# Patient Record
Sex: MALE | Race: WHITE | Employment: UNEMPLOYED | URBAN - METROPOLITAN AREA
[De-identification: names, ages, dates, MRNs, and addresses within clinical notes are randomized per-mention and may not be internally consistent; named-entity substitution may affect disease eponyms.]

---

## 2021-07-28 ENCOUNTER — APPOINTMENT (OUTPATIENT)
Dept: CT IMAGING | Age: 46
End: 2021-07-28
Payer: COMMERCIAL

## 2021-07-28 ENCOUNTER — HOSPITAL ENCOUNTER (EMERGENCY)
Age: 46
Discharge: HOME | End: 2021-07-28
Attending: EMERGENCY MEDICINE
Payer: COMMERCIAL

## 2021-07-28 VITALS
WEIGHT: 195 LBS | DIASTOLIC BLOOD PRESSURE: 88 MMHG | OXYGEN SATURATION: 98 % | HEIGHT: 70 IN | RESPIRATION RATE: 17 BRPM | SYSTOLIC BLOOD PRESSURE: 132 MMHG | TEMPERATURE: 98.3 F | BODY MASS INDEX: 27.92 KG/M2 | HEART RATE: 72 BPM

## 2021-07-28 DIAGNOSIS — J01.00 ACUTE MAXILLARY SINUSITIS, RECURRENCE NOT SPECIFIED: ICD-10-CM

## 2021-07-28 DIAGNOSIS — R42 DIZZINESS: Primary | ICD-10-CM

## 2021-07-28 LAB
ALBUMIN SERPL-MCNC: 4.5 G/DL (ref 3.5–4.6)
ALP BLD-CCNC: 64 U/L (ref 35–104)
ALT SERPL-CCNC: 19 U/L (ref 0–41)
ANION GAP SERPL CALCULATED.3IONS-SCNC: 12 MEQ/L (ref 9–15)
AST SERPL-CCNC: 13 U/L (ref 0–40)
BASOPHILS ABSOLUTE: 0 K/UL (ref 0–0.1)
BASOPHILS RELATIVE PERCENT: 0.6 % (ref 0.2–1.2)
BILIRUB SERPL-MCNC: 0.7 MG/DL (ref 0.2–0.7)
BUN BLDV-MCNC: 12 MG/DL (ref 6–20)
CALCIUM SERPL-MCNC: 9.3 MG/DL (ref 8.5–9.9)
CHLORIDE BLD-SCNC: 103 MEQ/L (ref 95–107)
CO2: 23 MEQ/L (ref 20–31)
CREAT SERPL-MCNC: 0.76 MG/DL (ref 0.7–1.2)
EKG ATRIAL RATE: 75 BPM
EKG P AXIS: 3 DEGREES
EKG P-R INTERVAL: 140 MS
EKG Q-T INTERVAL: 388 MS
EKG QRS DURATION: 86 MS
EKG QTC CALCULATION (BAZETT): 433 MS
EKG R AXIS: -6 DEGREES
EKG T AXIS: -10 DEGREES
EKG VENTRICULAR RATE: 75 BPM
EOSINOPHILS ABSOLUTE: 0.2 K/UL (ref 0–0.5)
EOSINOPHILS RELATIVE PERCENT: 3.3 % (ref 0.8–7)
GFR AFRICAN AMERICAN: >60
GFR NON-AFRICAN AMERICAN: >60
GLOBULIN: 3.5 G/DL (ref 2.3–3.5)
GLUCOSE BLD-MCNC: 95 MG/DL (ref 70–99)
HCT VFR BLD CALC: 45.3 % (ref 42–52)
HEMOGLOBIN: 15.9 G/DL (ref 13.7–17.5)
IMMATURE GRANULOCYTES #: 0 K/UL
IMMATURE GRANULOCYTES %: 0.4 %
LYMPHOCYTES ABSOLUTE: 1.3 K/UL (ref 1.3–3.6)
LYMPHOCYTES RELATIVE PERCENT: 24.8 %
MAGNESIUM: 2 MG/DL (ref 1.7–2.4)
MCH RBC QN AUTO: 30.6 PG (ref 25.7–32.2)
MCHC RBC AUTO-ENTMCNC: 35.1 % (ref 32.3–36.5)
MCV RBC AUTO: 87.1 FL (ref 79–92.2)
MONOCYTES ABSOLUTE: 0.4 K/UL (ref 0.3–0.8)
MONOCYTES RELATIVE PERCENT: 7 % (ref 5.3–12.2)
NEUTROPHILS ABSOLUTE: 3.3 K/UL (ref 1.8–5.4)
NEUTROPHILS RELATIVE PERCENT: 63.9 % (ref 34–67.9)
PDW BLD-RTO: 12.6 % (ref 11.6–14.4)
PLATELET # BLD: 174 K/UL (ref 163–337)
POTASSIUM SERPL-SCNC: 4.3 MEQ/L (ref 3.4–4.9)
RBC # BLD: 5.2 M/UL (ref 4.63–6.08)
SODIUM BLD-SCNC: 138 MEQ/L (ref 135–144)
TOTAL PROTEIN: 8 G/DL (ref 6.3–8)
TROPONIN: <0.01 NG/ML (ref 0–0.01)
WBC # BLD: 5.1 K/UL (ref 4.2–9)

## 2021-07-28 PROCEDURE — 84484 ASSAY OF TROPONIN QUANT: CPT

## 2021-07-28 PROCEDURE — 6370000000 HC RX 637 (ALT 250 FOR IP): Performed by: EMERGENCY MEDICINE

## 2021-07-28 PROCEDURE — 99284 EMERGENCY DEPT VISIT MOD MDM: CPT

## 2021-07-28 PROCEDURE — 85025 COMPLETE CBC W/AUTO DIFF WBC: CPT

## 2021-07-28 PROCEDURE — 36415 COLL VENOUS BLD VENIPUNCTURE: CPT

## 2021-07-28 PROCEDURE — 70450 CT HEAD/BRAIN W/O DYE: CPT

## 2021-07-28 PROCEDURE — 80053 COMPREHEN METABOLIC PANEL: CPT

## 2021-07-28 PROCEDURE — 93005 ELECTROCARDIOGRAM TRACING: CPT

## 2021-07-28 PROCEDURE — 2580000003 HC RX 258: Performed by: EMERGENCY MEDICINE

## 2021-07-28 PROCEDURE — 83735 ASSAY OF MAGNESIUM: CPT

## 2021-07-28 PROCEDURE — 93010 ELECTROCARDIOGRAM REPORT: CPT | Performed by: INTERNAL MEDICINE

## 2021-07-28 RX ORDER — MECLIZINE HYDROCHLORIDE 25 MG/1
25 TABLET ORAL 3 TIMES DAILY PRN
Qty: 30 TABLET | Refills: 0 | Status: SHIPPED | OUTPATIENT
Start: 2021-07-28 | End: 2021-08-07

## 2021-07-28 RX ORDER — 0.9 % SODIUM CHLORIDE 0.9 %
1000 INTRAVENOUS SOLUTION INTRAVENOUS ONCE
Status: COMPLETED | OUTPATIENT
Start: 2021-07-28 | End: 2021-07-28

## 2021-07-28 RX ORDER — MECLIZINE HCL 12.5 MG/1
25 TABLET ORAL ONCE
Status: COMPLETED | OUTPATIENT
Start: 2021-07-28 | End: 2021-07-28

## 2021-07-28 RX ORDER — CEFDINIR 300 MG/1
300 CAPSULE ORAL 2 TIMES DAILY
Qty: 20 CAPSULE | Refills: 0 | Status: SHIPPED | OUTPATIENT
Start: 2021-07-28 | End: 2021-08-07

## 2021-07-28 RX ADMIN — SODIUM CHLORIDE 1000 ML: 9 INJECTION, SOLUTION INTRAVENOUS at 14:12

## 2021-07-28 RX ADMIN — MECLIZINE 25 MG: 12.5 TABLET ORAL at 14:13

## 2021-07-28 ASSESSMENT — ENCOUNTER SYMPTOMS
CONSTIPATION: 0
CHEST TIGHTNESS: 0
BACK PAIN: 0
COUGH: 0
EYE REDNESS: 0
SINUS PRESSURE: 0
FACIAL SWELLING: 0
SORE THROAT: 0
TROUBLE SWALLOWING: 0
CHOKING: 0
WHEEZING: 0
VOICE CHANGE: 0
VOMITING: 0
BLOOD IN STOOL: 0
EYE DISCHARGE: 0
EYE PAIN: 0
SHORTNESS OF BREATH: 0
STRIDOR: 0
ABDOMINAL PAIN: 0
DIARRHEA: 0

## 2021-07-28 ASSESSMENT — PAIN SCALES - GENERAL: PAINLEVEL_OUTOF10: 4

## 2021-07-28 ASSESSMENT — PAIN DESCRIPTION - FREQUENCY: FREQUENCY: CONTINUOUS

## 2021-07-28 ASSESSMENT — PAIN DESCRIPTION - PAIN TYPE: TYPE: ACUTE PAIN

## 2021-07-28 ASSESSMENT — PAIN DESCRIPTION - ORIENTATION: ORIENTATION: LEFT

## 2021-07-28 ASSESSMENT — PAIN DESCRIPTION - PROGRESSION: CLINICAL_PROGRESSION: GRADUALLY WORSENING

## 2021-07-28 ASSESSMENT — PAIN DESCRIPTION - LOCATION: LOCATION: SHOULDER;BACK

## 2021-07-28 ASSESSMENT — PAIN DESCRIPTION - ONSET: ONSET: PROGRESSIVE

## 2021-07-28 NOTE — ED PROVIDER NOTES
[address] ED  eMERGENCY dEPARTMENT eNCOUnter      Pt Name: Dmitry Jaime  MRN: [id number]  [place] 1975  Date of evaluation: 7/28/2021  Provider: Jerad Oshea MD    [address]       Chief Complaint   Patient presents with    Dizziness     Started at 10am, no history of vertigo, sinus infection last week       HISTORY OF PRESENT ILLNESS   (Location/Symptom, Timing/Onset,Context/Setting, Quality, Duration, Modifying Factors, Severity)  Note limiting factors. Dmitry Jaime is a 39 y.o. male who presents to the emergency department patient come to the emergency with sudden onset of dizziness unable to hold the walls when he went to the bathroom was fine last night denies any headache but has sinus infection sinus congestion did 3 days of decongestant last week no fever no neck pain no injury no fall no prior history of vertigo denies any nausea vomiting eating drinking normally denies any alcohol last night has no chest tightness no chest pain no palpitation as per patient he has a lump in the testicle for long time and has appointment coming up in the month of August no UTI symptoms no fever no chills no drainage  HPI    NursingNotes were reviewed. REVIEW OF SYSTEMS    (2-9 systems for level 4, 10 or more for level 5)     Review of Systems   Constitutional: Negative. Negative for activity change and fever. HENT: Negative for congestion, drooling, facial swelling, mouth sores, nosebleeds, sinus pressure, sore throat, trouble swallowing and voice change. Eyes: Negative for pain, discharge, redness and visual disturbance. Respiratory: Negative for cough, choking, chest tightness, shortness of breath, wheezing and stridor. Cardiovascular: Negative for chest pain, palpitations and leg swelling. Gastrointestinal: Negative for abdominal pain, blood in stool, constipation, diarrhea and vomiting. Endocrine: Negative for cold intolerance, polyphagia and polyuria.    Genitourinary: Negative for dysuria, flank pain, frequency, genital sores and urgency. Musculoskeletal: Negative for back pain, joint swelling, neck pain and neck stiffness. Skin: Negative for pallor and rash. Neurological: Positive for dizziness, weakness and light-headedness. Negative for tremors, seizures, syncope, numbness and headaches. Hematological: Negative for adenopathy. Does not bruise/bleed easily. Psychiatric/Behavioral: Negative for agitation, behavioral problems, hallucinations and sleep disturbance. The patient is not hyperactive. All other systems reviewed and are negative. Except as noted above the remainder of the review of systems was reviewed and negative. PAST MEDICAL HISTORY     Past Medical History:   Diagnosis Date    Kidney stone          SURGICALHISTORY       Past Surgical History:   Procedure Laterality Date    FRACTURE SURGERY      VASECTOMY           CURRENT MEDICATIONS       Previous Medications    No medications on file       ALLERGIES     Patient has no known allergies. FAMILY HISTORY     History reviewed. No pertinent family history. SOCIAL HISTORY       Social History     Socioeconomic History    Marital status:      Spouse name: None    Number of children: None    Years of education: None    Highest education level: None   Occupational History    None   Tobacco Use    Smoking status: Never Smoker    Smokeless tobacco: Never Used   Vaping Use    Vaping Use: Never used   Substance and Sexual Activity    Alcohol use: Yes     Comment: social    Drug use: Never    Sexual activity: Never   Other Topics Concern    None   Social History Narrative    None     Social Determinants of Health     Financial Resource Strain:     Difficulty of Paying Living Expenses:    Food Insecurity:     Worried About Running Out of Food in the Last Year:     [address] in the Last Year:    Transportation Needs:     Lack of Transportation (Medical):      Lack of Transportation (Non-Medical):    Physical Activity:     Days of Exercise per Week:     Minutes of Exercise per Session:    Stress:     Feeling of Stress :    Social Connections:     Frequency of Communication with Friends and Family:     Frequency of Social Gatherings with Friends and Family:     Attends [employer] Services:     Active Member of Clubs or Organizations:     Attends Club or Organization Meetings:     Marital Status:    Intimate Partner Violence:     Fear of Current or Ex-Partner:     Emotionally Abused:     Physically Abused:     Sexually Abused:        SCREENINGS   NIH Stroke Scale  NIH Stroke Scale Assessed: Yes  Interval: Baseline  Level of Consciousness (1a. ): Alert  LOC Questions (1b. ): Answers both correctly  LOC Commands (1c. ): Performs both tasks correctly  Best Gaze (2. ): Normal  Visual (3. ): No visual loss  Facial Palsy (4. ): Normal symmetrical movement  Motor Arm, Left (5a. ): No drift  Motor Arm, Right (5b. ): No drift  Motor Leg, Left (6a. ): No drift  Motor Leg, Right (6b. ): No drift  Limb Ataxia (7. ): Absent  Sensory (8. ): Normal  Best Language (9. ): No aphasia  Dysarthria (10. ): Normal  Extinction and Inattention (11): No abnormality  Total: 0  @FLOW([id number])@      PHYSICAL EXAM    (up to 7 for level 4, 8 or more for level 5)     ED Triage Vitals [07/28/21 1323]   BP Temp Temp Source Pulse Resp SpO2 Height Weight   (!) 150/92 98.3 °F (36.8 °C) Oral 80 18 96 % 5' 10\" (1.778 m) 195 lb (88.5 kg)       Physical Exam  Vitals and nursing note reviewed. Constitutional:       General: He is not in acute distress. Appearance: Normal appearance. He is well-developed. He is not ill-appearing, toxic-appearing or diaphoretic. Comments: Alert cooperative patient talks in full sentences no slurred speech noted moving all the extremities very well looks in sound congested nasally when he talk   HENT:      Head: Normocephalic and atraumatic.       Right Ear: Tympanic membrane, ear canal and external ear normal.      Left Ear: Tympanic membrane, ear canal and external ear normal.      Nose: Nose normal.      Comments: Attention go to ear nose throat patient nasally congested there is no exudate no blisters in the mouth     Mouth/Throat:      Mouth: Mucous membranes are moist.      Pharynx: No posterior oropharyngeal erythema. Eyes:      General:         Right eye: No discharge. Left eye: No discharge. Extraocular Movements: Extraocular movements intact. Comments: Attention to the eyes pupil 5 to 6 mm equal reactive light no nystagmus noted fundi are sharp   Cardiovascular:      Rate and Rhythm: Normal rate and regular rhythm. Heart sounds: Normal heart sounds. No murmur heard. No gallop. Pulmonary:      Effort: No respiratory distress. Breath sounds: Normal breath sounds. No wheezing. Abdominal:      General: Bowel sounds are normal.      Palpations: Abdomen is soft. There is no mass. Tenderness: There is no rebound. Genitourinary:     Penis: Normal.       Testes: Normal.      Comments: Patient genitalia exam is normal circumcised male patient both testicles are down no cellulitis no hydrocele noted patient has a minimal scar tissue or thickening of the skin to the right scrotal area noted no sebaceous cyst noted no cellulitis noted  Musculoskeletal:         General: No swelling, tenderness or deformity. Normal range of motion. Cervical back: Neck supple. No rigidity or tenderness. Right lower leg: No edema. Lymphadenopathy:      Cervical: No cervical adenopathy. Skin:     General: Skin is warm. Capillary Refill: Capillary refill takes less than 2 seconds. Coloration: Skin is not jaundiced. Findings: No bruising, erythema, lesion or rash. Neurological:      General: No focal deficit present. Mental Status: He is alert and oriented to person, place, and time.       Cranial Nerves: No cranial nerve deficit. Sensory: No sensory deficit. Motor: No weakness or abnormal muscle tone. Coordination: Coordination normal.      Gait: Gait normal.      Deep Tendon Reflexes: Reflexes normal.   Psychiatric:         Behavior: Behavior normal.         Thought Content: Thought content normal.         DIAGNOSTIC RESULTS     EKG: All EKG's are interpreted by the Emergency Department Physician who either signs or Co-signsthis chart in the absence of a cardiologist.        RADIOLOGY:   Jose Auberry such as CT, Ultrasound and MRI are read by the radiologist. Bryce Cleverly radiographic images are visualized and preliminarily interpreted by the emergency physician with the below findings:        Interpretation per the Radiologist below, if available at the time ofthis note:    CT Head WO Contrast   Final Result   No acute hemorrhage or extra-axial fluid collection is seen. Chronic sinus disease is seen. .      All CT scans at this facility use dose modulation, iterative reconstruction, and/or weight based dosing when appropriate to reduce radiation dose to as low as reasonably achievable. ED BEDSIDE ULTRASOUND:   Performed by ED Physician - none    LABS:  Labs Reviewed   COMPREHENSIVE METABOLIC PANEL   CBC WITH AUTO DIFFERENTIAL   MAGNESIUM   TROPONIN       All other labs were within normal range or not returned as of this dictation.     EMERGENCY DEPARTMENT COURSE and DIFFERENTIAL DIAGNOSIS/MDM:   Vitals:    Vitals:    07/28/21 1323 07/28/21 1441   BP: (!) 150/92 132/88   Pulse: 80 72   Resp: 18 17   Temp: 98.3 °F (36.8 °C)    TempSrc: Oral    SpO2: 96% 98%   Weight: 195 lb (88.5 kg)    Height: 5' 10\" (1.778 m)            MDM  Number of Diagnoses or Management Options  Acute maxillary sinusitis, recurrence not specified  Dizziness  Diagnosis management comments: Patient started on for dizziness this morning when he woke up has to hold the wall become lightheaded and change position make it worse history of chronic sinus infection nasal congestion did take decongestant for 3 days last week patient initial blood pressure was slightly on the higher side but came back nicely getting hydration feeling better but not completely CAT scan performed has a chronic maxillary and ethmoid sinus infection otherwise essentially normal EKG performed normal sinus sinus rhythm with minimal criteria for LVH rate of 75/min FL interval 140 ms QT interval 388 ms and QRS [id number] ms patient had no neuro deficit patient scrotal examination performed has no cyst both testicles are down no cellulitis seems more likely thickening of the skin with a scar tissue formation in the right testicle       Amount and/or Complexity of Data Reviewed  Clinical lab tests: ordered and reviewed  Tests in the radiology section of CPT®: ordered and reviewed        CRITICAL CARE TIME   Total Critical Care time was  minutes, excluding separately reportableprocedures. There was a high probability of clinicallysignificant/life threatening deterioration in the patient's condition which required my urgent intervention. ONSULTS:  None    PROCEDURES:  Unless otherwise noted below, none     Procedures    FINAL IMPRESSION      1. Dizziness    2.  Acute maxillary sinusitis, recurrence not specified          DISPOSITION/PLAN   DISPOSITION        PATIENT REFERRED TO:  Beto Wilkerson DO  [address] [facility] [address]  [phone]    In 2 days        DISCHARGE MEDICATIONS:  New Prescriptions    CEFDINIR (OMNICEF) 300 MG CAPSULE    Take 1 capsule by mouth 2 times daily for 10 days    MECLIZINE (ANTIVERT) 25 MG TABLET    Take 1 tablet by mouth 3 times daily as needed for Dizziness          (Please note that portions of this note were completed with a voice recognition program.  Efforts were made to edit the dictations but occasionally words are mis-transcribed.)    Armond Martin MD (electronically signed)  Attending Emergency Physician       Armond Martin MD  07/28/21 Radha Ivy MD  07/28/21 Radha Ivy MD  07/28/21 6215

## 2021-07-28 NOTE — ED TRIAGE NOTES
Patient in with dizziness that he states he woke up with, denies chest pain or SOB. Denies head pain or head injury.

## 2024-12-03 ENCOUNTER — OFFICE VISIT (OUTPATIENT)
Dept: PRIMARY CARE CLINIC | Age: 49
End: 2024-12-03
Payer: COMMERCIAL

## 2024-12-03 VITALS
SYSTOLIC BLOOD PRESSURE: 132 MMHG | TEMPERATURE: 79 F | OXYGEN SATURATION: 98 % | DIASTOLIC BLOOD PRESSURE: 84 MMHG | BODY MASS INDEX: 29.78 KG/M2 | HEIGHT: 70 IN | WEIGHT: 208 LBS

## 2024-12-03 DIAGNOSIS — E78.2 MIXED HYPERLIPIDEMIA: ICD-10-CM

## 2024-12-03 DIAGNOSIS — Z13.1 SCREENING FOR DIABETES MELLITUS: ICD-10-CM

## 2024-12-03 DIAGNOSIS — J32.9 CHRONIC SINUSITIS, UNSPECIFIED LOCATION: Primary | ICD-10-CM

## 2024-12-03 DIAGNOSIS — E06.3 HASHIMOTO'S THYROIDITIS: ICD-10-CM

## 2024-12-03 DIAGNOSIS — E53.8 B12 DEFICIENCY: ICD-10-CM

## 2024-12-03 DIAGNOSIS — N20.1 URETERAL CALCULI: ICD-10-CM

## 2024-12-03 DIAGNOSIS — E55.9 VITAMIN D DEFICIENCY: ICD-10-CM

## 2024-12-03 DIAGNOSIS — M48.02 SPINAL STENOSIS OF CERVICAL REGION: ICD-10-CM

## 2024-12-03 DIAGNOSIS — G43.119 INTRACTABLE MIGRAINE WITH AURA WITHOUT STATUS MIGRAINOSUS: ICD-10-CM

## 2024-12-03 DIAGNOSIS — K76.0 HEPATIC STEATOSIS: ICD-10-CM

## 2024-12-03 PROBLEM — K76.89 LIVER CYST: Status: ACTIVE | Noted: 2023-02-08

## 2024-12-03 PROBLEM — N20.0 NEPHROLITHIASIS: Status: ACTIVE | Noted: 2023-02-08

## 2024-12-03 LAB
ALBUMIN SERPL-MCNC: 4.6 G/DL (ref 3.5–4.6)
ALP SERPL-CCNC: 60 U/L (ref 35–104)
ALT SERPL-CCNC: 29 U/L (ref 0–41)
ANION GAP SERPL CALCULATED.3IONS-SCNC: 11 MEQ/L (ref 9–15)
AST SERPL-CCNC: 25 U/L (ref 0–40)
BILIRUB SERPL-MCNC: 0.6 MG/DL (ref 0.2–0.7)
BUN SERPL-MCNC: 11 MG/DL (ref 6–20)
CALCIUM SERPL-MCNC: 9.4 MG/DL (ref 8.5–9.9)
CHLORIDE SERPL-SCNC: 103 MEQ/L (ref 95–107)
CHOLEST SERPL-MCNC: 212 MG/DL (ref 0–199)
CO2 SERPL-SCNC: 25 MEQ/L (ref 20–31)
CREAT SERPL-MCNC: 0.78 MG/DL (ref 0.7–1.2)
GLOBULIN SER CALC-MCNC: 2.9 G/DL (ref 2.3–3.5)
GLUCOSE SERPL-MCNC: 103 MG/DL (ref 70–99)
HDLC SERPL-MCNC: 31 MG/DL (ref 40–59)
LDL CHOLESTEROL: 133 MG/DL (ref 0–129)
POTASSIUM SERPL-SCNC: 4.4 MEQ/L (ref 3.4–4.9)
PROT SERPL-MCNC: 7.5 G/DL (ref 6.3–8)
SODIUM SERPL-SCNC: 139 MEQ/L (ref 135–144)
T4 FREE SERPL-MCNC: 1.05 NG/DL (ref 0.84–1.68)
TRIGLYCERIDE, FASTING: 238 MG/DL (ref 0–150)
TSH SERPL-MCNC: 5.18 UIU/ML (ref 0.44–3.86)

## 2024-12-03 PROCEDURE — 99204 OFFICE O/P NEW MOD 45 MIN: CPT | Performed by: STUDENT IN AN ORGANIZED HEALTH CARE EDUCATION/TRAINING PROGRAM

## 2024-12-03 RX ORDER — FLUTICASONE PROPIONATE 50 MCG
1 SPRAY, SUSPENSION (ML) NASAL DAILY PRN
COMMUNITY

## 2024-12-03 SDOH — ECONOMIC STABILITY: FOOD INSECURITY: WITHIN THE PAST 12 MONTHS, THE FOOD YOU BOUGHT JUST DIDN'T LAST AND YOU DIDN'T HAVE MONEY TO GET MORE.: NEVER TRUE

## 2024-12-03 SDOH — ECONOMIC STABILITY: FOOD INSECURITY: WITHIN THE PAST 12 MONTHS, YOU WORRIED THAT YOUR FOOD WOULD RUN OUT BEFORE YOU GOT MONEY TO BUY MORE.: NEVER TRUE

## 2024-12-03 SDOH — ECONOMIC STABILITY: INCOME INSECURITY: HOW HARD IS IT FOR YOU TO PAY FOR THE VERY BASICS LIKE FOOD, HOUSING, MEDICAL CARE, AND HEATING?: NOT HARD AT ALL

## 2024-12-03 ASSESSMENT — PATIENT HEALTH QUESTIONNAIRE - PHQ9
SUM OF ALL RESPONSES TO PHQ QUESTIONS 1-9: 0
SUM OF ALL RESPONSES TO PHQ QUESTIONS 1-9: 0
SUM OF ALL RESPONSES TO PHQ9 QUESTIONS 1 & 2: 0
SUM OF ALL RESPONSES TO PHQ QUESTIONS 1-9: 0
SUM OF ALL RESPONSES TO PHQ QUESTIONS 1-9: 0
2. FEELING DOWN, DEPRESSED OR HOPELESS: NOT AT ALL
1. LITTLE INTEREST OR PLEASURE IN DOING THINGS: NOT AT ALL

## 2024-12-03 NOTE — PROGRESS NOTES
Pupils: Pupils are equal, round, and reactive to light.   Cardiovascular:      Rate and Rhythm: Normal rate and regular rhythm.      Pulses: Normal pulses.      Heart sounds: Normal heart sounds.   Pulmonary:      Effort: No respiratory distress.      Breath sounds: Normal breath sounds.   Musculoskeletal:      Cervical back: Normal range of motion.   Skin:     Findings: No rash.   Neurological:      General: No focal deficit present.      Mental Status: He is alert and oriented to person, place, and time.   Psychiatric:         Mood and Affect: Mood normal.               Reviewed with the patient: current clinical status, medications, activities and diet.     Side effects, adverse effects of the medication prescribed today, as well as treatment plan/ rationale and result expectations have been discussed with the patient who expresses understanding and desires to proceed.    Close follow up to evaluate treatment results and for coordination of care.  I have reviewed the patient's medical history in detail and updated the computerized patient record.    Kirby Rivero MD

## 2024-12-03 NOTE — PATIENT INSTRUCTIONS
Please have your labs completed and we will follow-up with you about the results  Take Augmentin twice daily for 5 days and if no improvement we can try a steroid.  If you receive an email to fill out a survey about our care here today, I would greatly appreciate it if you could fill it out for me, thank you!

## 2024-12-04 LAB
ESTIMATED AVERAGE GLUCOSE: 117 MG/DL
FOLATE: 10.5 NG/ML (ref 4.8–24.2)
HBA1C MFR BLD: 5.7 % (ref 4–6)
VITAMIN B-12: 342 PG/ML (ref 232–1245)
VITAMIN D 25-HYDROXY: 29.1 NG/ML (ref 30–100)

## 2024-12-04 RX ORDER — ERGOCALCIFEROL 1.25 MG/1
50000 CAPSULE, LIQUID FILLED ORAL WEEKLY
Qty: 12 CAPSULE | Refills: 1 | Status: SHIPPED | OUTPATIENT
Start: 2024-12-04

## 2024-12-23 ENCOUNTER — TELEPHONE (OUTPATIENT)
Dept: PRIMARY CARE CLINIC | Age: 49
End: 2024-12-23

## 2024-12-23 DIAGNOSIS — J32.9 CHRONIC SINUSITIS, UNSPECIFIED LOCATION: Primary | ICD-10-CM

## 2024-12-23 NOTE — TELEPHONE ENCOUNTER
Pt was just here to see Dr. SCHULER on 12/3.  He states that he was to call back if the antibiotic did not work.  Pt is requesting a Z-Nii since he had minor relief and had to call off work yesterday and the day before.  Pt would like this to be sent to [facility] in [place] on [address].

## 2024-12-24 RX ORDER — AZITHROMYCIN 250 MG/1
TABLET, FILM COATED ORAL
Qty: 6 TABLET | Refills: 0 | Status: SHIPPED | OUTPATIENT
Start: 2024-12-24 | End: 2025-01-03

## 2025-07-02 ENCOUNTER — OFFICE VISIT (OUTPATIENT)
Dept: PRIMARY CARE CLINIC | Age: 50
End: 2025-07-02
Payer: COMMERCIAL

## 2025-07-02 VITALS
HEIGHT: 70 IN | BODY MASS INDEX: 29.35 KG/M2 | SYSTOLIC BLOOD PRESSURE: 134 MMHG | HEART RATE: 82 BPM | DIASTOLIC BLOOD PRESSURE: 88 MMHG | WEIGHT: 205 LBS | OXYGEN SATURATION: 98 %

## 2025-07-02 DIAGNOSIS — S39.012A BACK STRAIN, INITIAL ENCOUNTER: ICD-10-CM

## 2025-07-02 DIAGNOSIS — E06.3 HASHIMOTO'S THYROIDITIS: Primary | ICD-10-CM

## 2025-07-02 DIAGNOSIS — N20.0 NEPHROLITHIASIS: ICD-10-CM

## 2025-07-02 DIAGNOSIS — K76.0 HEPATIC STEATOSIS: ICD-10-CM

## 2025-07-02 DIAGNOSIS — E78.2 MIXED HYPERLIPIDEMIA: ICD-10-CM

## 2025-07-02 DIAGNOSIS — E55.9 VITAMIN D DEFICIENCY: ICD-10-CM

## 2025-07-02 PROCEDURE — 99214 OFFICE O/P EST MOD 30 MIN: CPT | Performed by: STUDENT IN AN ORGANIZED HEALTH CARE EDUCATION/TRAINING PROGRAM

## 2025-07-02 PROCEDURE — 96372 THER/PROPH/DIAG INJ SC/IM: CPT | Performed by: STUDENT IN AN ORGANIZED HEALTH CARE EDUCATION/TRAINING PROGRAM

## 2025-07-02 RX ORDER — KETOROLAC TROMETHAMINE 10 MG/1
10 TABLET, FILM COATED ORAL EVERY 6 HOURS PRN
Qty: 120 TABLET | Refills: 5 | Status: SHIPPED | OUTPATIENT
Start: 2025-07-02 | End: 2025-12-29

## 2025-07-02 RX ORDER — KETOROLAC TROMETHAMINE 30 MG/ML
30 INJECTION, SOLUTION INTRAMUSCULAR; INTRAVENOUS ONCE
Status: COMPLETED | OUTPATIENT
Start: 2025-07-02 | End: 2025-07-02

## 2025-07-02 RX ORDER — OXYCODONE AND ACETAMINOPHEN 5; 325 MG/1; MG/1
1 TABLET ORAL EVERY 4 HOURS PRN
COMMUNITY
End: 2025-07-02

## 2025-07-02 RX ORDER — OXYCODONE AND ACETAMINOPHEN 10; 325 MG/1; MG/1
1 TABLET ORAL EVERY 6 HOURS PRN
Qty: 20 TABLET | Refills: 0 | Status: SHIPPED | OUTPATIENT
Start: 2025-07-02 | End: 2025-07-07

## 2025-07-02 RX ORDER — OXYCODONE AND ACETAMINOPHEN 10; 325 MG/1; MG/1
1 TABLET ORAL EVERY 6 HOURS PRN
Qty: 20 TABLET | Refills: 0 | Status: SHIPPED | OUTPATIENT
Start: 2025-07-02 | End: 2025-07-02

## 2025-07-02 RX ORDER — ONDANSETRON 4 MG/1
4 TABLET, ORALLY DISINTEGRATING ORAL 3 TIMES DAILY PRN
Qty: 90 TABLET | Refills: 1 | Status: SHIPPED | OUTPATIENT
Start: 2025-07-02

## 2025-07-02 RX ADMIN — KETOROLAC TROMETHAMINE 30 MG: 30 INJECTION, SOLUTION INTRAMUSCULAR; INTRAVENOUS at 12:16

## 2025-07-02 SDOH — ECONOMIC STABILITY: FOOD INSECURITY: WITHIN THE PAST 12 MONTHS, YOU WORRIED THAT YOUR FOOD WOULD RUN OUT BEFORE YOU GOT MONEY TO BUY MORE.: NEVER TRUE

## 2025-07-02 SDOH — ECONOMIC STABILITY: FOOD INSECURITY: WITHIN THE PAST 12 MONTHS, THE FOOD YOU BOUGHT JUST DIDN'T LAST AND YOU DIDN'T HAVE MONEY TO GET MORE.: NEVER TRUE

## 2025-07-02 ASSESSMENT — PATIENT HEALTH QUESTIONNAIRE - PHQ9
SUM OF ALL RESPONSES TO PHQ QUESTIONS 1-9: 0
2. FEELING DOWN, DEPRESSED OR HOPELESS: NOT AT ALL
1. LITTLE INTEREST OR PLEASURE IN DOING THINGS: NOT AT ALL
SUM OF ALL RESPONSES TO PHQ QUESTIONS 1-9: 0

## 2025-07-02 NOTE — PROGRESS NOTES
7/2/2025        Lucian Gaytan 1975 is a 49 y.o. 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  Went to masseuse  yesterday was in spasms yesterday taking two percocet every 4-6 hours and cannot move if not taking.  Is out of medication states in process of starting [facility] claim        HPI: 6-month follow-up     Hashimoto's thyroiditis  Last TSH was 7 and last T4 was 0.9.  This was roughly 1 year and 8 months ago.  Denies side effects.  Takes vitamins to control his levels.     Hepatic steatosis  Liver enzymes normal, repeat testing ordered     HLD  Last lipid panel showed LDL of 106, HDL of 31, triglycerides of 289     Vitamin D deficiency  Last check 12/3/2024 showed level of 29, improved from 22, he takes vitamin D.     Chronic kidney stones  Sees nephrology, adjusting diet     MSK  He states that 2 days ago he pulled a patient across a bed and yesterday as he woke up he was not able to move without pain.  He states that he went to a masseuse yesterday due to back spasms.  He states that he has taken Percocet every 4-6 hours and is unable to move without taking this medication.      Health Maintenance  Colonoscopy: Cologuard normal 1/30/2023  PNA VAX: Not due  Shingles VAX: Not due  Tetanus VAX: 2020  PSA: History prostate carcinoma in his grandfather, last PSA 1/30/2023 was normal.  Sees urology for yearly checks.    Assessment & Plan   1. Hashimoto's thyroiditis  2. Mixed hyperlipidemia  3. Vitamin D deficiency  4. Nephrolithiasis  5. Hepatic steatosis  6. Back strain, initial encounter  -     ketorolac (TORADOL) injection 30 mg; 30 mg, IntraMUSCular, ONCE, 1 dose, On Wed 7/2/25 at 1145Do not administer for more than 5 days  -     ketorolac (TORADOL) 10 MG tablet; Take 1 tablet by mouth every 6 hours as needed for Pain, Disp-120 tablet, R-5Normal  -     ondansetron (ZOFRAN-ODT) 4 MG

## 2025-07-03 DIAGNOSIS — S39.012A BACK STRAIN, INITIAL ENCOUNTER: ICD-10-CM

## 2025-07-03 RX ORDER — KETOROLAC TROMETHAMINE 10 MG/1
TABLET, FILM COATED ORAL
Qty: 20 TABLET | Refills: 0 | OUTPATIENT
Start: 2025-07-03

## 2025-07-07 ENCOUNTER — TELEPHONE (OUTPATIENT)
Dept: PRIMARY CARE CLINIC | Age: 50
End: 2025-07-07

## 2025-07-07 DIAGNOSIS — S39.012A BACK STRAIN, INITIAL ENCOUNTER: Primary | ICD-10-CM

## 2025-07-07 RX ORDER — KETOROLAC TROMETHAMINE 10 MG/1
10 TABLET, FILM COATED ORAL EVERY 6 HOURS PRN
Qty: 20 TABLET | Refills: 11 | Status: SHIPPED | OUTPATIENT
Start: 2025-07-07

## 2025-07-07 NOTE — TELEPHONE ENCOUNTER
[facility] is calling regarding clarification on a prescription sent ketorolac.  It was originally sent as a 30-day supply but the doctor changed it today to a 5-day supply with 11 refills.  Pharmacy is stating that this is not appropriate for long-term and would recommend 0 refills.  Verbal was given to the pharmacy per provider for 0 refills.

## 2025-07-07 NOTE — TELEPHONE ENCOUNTER
2 things:       1.pt states he isnt any better and can not return to work this week.  Is asking for a note to rtw on Monday.  He was due to go back tomorrow.   Call and he will .      2. He said [facility] will not give him any of the toradol tabs you sent in, because the rx was for #120, he needs a new order for that, but only #20. [facility] said they can not give more than #20 at a time.  Needs a new rx for the medication.    Lastly Marcie has paperwork for time off, and that should reflect the additional time of this week if you approve.